# Patient Record
Sex: MALE | Race: WHITE | URBAN - METROPOLITAN AREA
[De-identification: names, ages, dates, MRNs, and addresses within clinical notes are randomized per-mention and may not be internally consistent; named-entity substitution may affect disease eponyms.]

---

## 2018-07-11 ENCOUNTER — THERAPY VISIT (OUTPATIENT)
Dept: PHYSICAL THERAPY | Facility: CLINIC | Age: 28
End: 2018-07-11
Payer: OTHER MISCELLANEOUS

## 2018-07-11 DIAGNOSIS — M54.41 BILATERAL LOW BACK PAIN WITH RIGHT-SIDED SCIATICA: Primary | ICD-10-CM

## 2018-07-11 PROCEDURE — 97161 PT EVAL LOW COMPLEX 20 MIN: CPT | Mod: GP | Performed by: PHYSICAL THERAPIST

## 2018-07-11 PROCEDURE — 97110 THERAPEUTIC EXERCISES: CPT | Mod: GP | Performed by: PHYSICAL THERAPIST

## 2018-07-11 NOTE — LETTER
[facility] PHYSICAL THERAP[facility]   [address] [address]  [phone]    2018    Re: Amrit Mackenzie   :   1990  MRN:  [id number]   REFERRING PHYSICIAN:   Valentino Villarreal    [facility] PHYSICAL [facility]ER[facility]  Date of Initial Evaluation:  18  Visits:  Rxs Used: 1  Reason for Referral:  Bilateral low back pain with right-sided sciatica    EVALUATION SUMMARY    [facility] Initial Evaluation  Subjective:  Patient is a 28 year old male presenting with rehab back hpi.   Amrit Mackenzie is a 28 year old male with a lumbar condition.  Condition occurred with:  Lifting.  Condition occurred: at work.  This is a new condition  Pt hurt low back lifting heavy object at work (70 lbs) on 18.   Patient reports pain:  Central lumbar spine.  Radiates to:  Gluteals right and thigh right.  Pain is described as burning and aching and is intermittent and reported as 1/10.  Associated symptoms:  Numbness and tingling. Pain is worse during the day.  Symptoms are exacerbated by lifting and relieved by activity/movement and rest.  Since onset symptoms are rapidly improving.  Special testing: none reported.  Previous treatment: n/a.  Improvement with previous treatment: n/a.  General health as reported by patient is good.  Past medical history: overweight, high BP.  Medical allergies: no.  Surgical history: eartubes.  Current medications:  Muscle relaxants and pain medication.  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  Primary job tasks include:  Driving, lifting, operating a machine, prolonged standing and repetitive tasks.  Barriers include:  None as reported by the patient.  Red flags:  None as reported by the patient.    Objective:  Standing Alignment:    Lumbar:  Anterior pelvic tilt     Lumbar/SI Evaluation  ROM:    AROM Lumbar:   Flexion:          80%   Ext:                     80%   Side Bend:        Left:  80%    Right:  80%  Rotation:           Left:     Right:   Side Glide:        Left:     Right:     Neural Tension/Mobility:  Lumbar:  Normal      Lumbar Palpation:  normal  Assessment/Plan:    Patient is a 28 year old male with lumbar complaints.    Patient has the following significant findings with corresponding treatment plan.                Diagnosis 1:  LBP  Pain -  mechanical traction, manual therapy, splint/taping/bracing/orthotics, self management, education, directional preference exercise and home program  Decreased ROM/flexibility - manual therapy and therapeutic exercise  Re: Amrit Mackenzie     Decreased joint mobility - manual therapy and therapeutic exercise  Decreased strength - therapeutic exercise and therapeutic activities  Impaired balance - neuro re-education and therapeutic activities    Therapy Evaluation Codes:   1) History comprised of:   Personal factors that impact the plan of care:      None.    Comorbidity factors that impact the plan of care are:      High blood pressure and Overweight.     Medications impacting care: Muscle relaxant and Pain.  2) Examination of Body Systems comprised of:   Body structures and functions that impact the plan of care:      Lumbar spine.   Activity limitations that impact the plan of care are:      Lifting, Working, Sleeping and Laying down.  3) Clinical presentation characteristics are:   Stable/Uncomplicated.  4) Decision-Making    Low complexity using standardized patient assessment instrument and/or measureable assessment of functional outcome.  Cumulative Therapy Evaluation is: Low complexity.    Previous and current functional limitations:  (See Goal Flow Sheet for this information)    Short term and Long term goals: (See Goal Flow Sheet for this information)     Communication ability:  Patient appears to be able to clearly communicate and understand verbal and written communication and follow directions  correctly.  Treatment Explanation - The following has been discussed with the patient:   RX ordered/plan of care  Anticipated outcomes  Possible risks and side effects  This patient would benefit from PT intervention to resume normal activities.   Rehab potential is good.    Frequency:  1 X week, once daily  Duration:  for 6 weeks  Discharge Plan:  Achieve all LTG.  Independent in home treatment program.  Reach maximal therapeutic benefit.    Please refer to the daily flowsheet for treatment today, total treatment time and time spent performing 1:1 timed codes.     Thank you for your referral.    INQUIRIES  Therapist: Omega Neri DPT  [facility]  [address] [place] [address]  Phone: [phone]  Fax: [phone]

## 2018-07-11 NOTE — PROGRESS NOTES
[place] [facility] Initial Evaluation  Subjective:  Patient is a 28 year old male presenting with rehab back hpi.   Amrit Mackenzie is a 28 year old male with a lumbar condition.  Condition occurred with:  Lifting.  Condition occurred: at work.  This is a new condition  Pt hurt low back lifting heavy object at work (70 lbs) on 6/27/18.   .    Patient reports pain:  Central lumbar spine.  Radiates to:  Gluteals right and thigh right.  Pain is described as burning and aching and is intermittent and reported as 1/10.  Associated symptoms:  Numbness and tingling. Pain is worse during the day.  Symptoms are exacerbated by lifting and relieved by activity/movement and rest.  Since onset symptoms are rapidly improving.  Special testing: none reported.  Previous treatment: n/a.  Improvement with previous treatment: n/a.  General health as reported by patient is good.  Past medical history: overweight, high BP.  Medical allergies: no.  Surgical history: eartubes.  Current medications:  Muscle relaxants and pain medication.  Current occupation is /Technician  .  Patient is working in normal job with restrictions.  Primary job tasks include:  Driving, lifting, operating a machine, prolonged standing and repetitive tasks.    Barriers include:  None as reported by the patient.    Red flags:  None as reported by the patient.                        Objective:  Standing Alignment:        Lumbar:  Anterior pelvic tilt                           Lumbar/SI Evaluation  ROM:    AROM Lumbar:   Flexion:          80%   Ext:                    80%   Side Bend:        Left:  80%    Right:  80%  Rotation:           Left:     Right:   Side Glide:        Left:     Right:                   Neural Tension/Mobility:  Lumbar:  Normal        Lumbar Palpation:  normal                                                         General     ROS    Assessment/Plan:    Patient is a 28 year old male with lumbar complaints.    Patient has  the following significant findings with corresponding treatment plan.                Diagnosis 1:  LBP  Pain -  mechanical traction, manual therapy, splint/taping/bracing/orthotics, self management, education, directional preference exercise and home program  Decreased ROM/flexibility - manual therapy and therapeutic exercise  Decreased joint mobility - manual therapy and therapeutic exercise  Decreased strength - therapeutic exercise and therapeutic activities  Impaired balance - neuro re-education and therapeutic activities    Therapy Evaluation Codes:   1) History comprised of:   Personal factors that impact the plan of care:      None.    Comorbidity factors that impact the plan of care are:      High blood pressure and Overweight.     Medications impacting care: Muscle relaxant and Pain.  2) Examination of Body Systems comprised of:   Body structures and functions that impact the plan of care:      Lumbar spine.   Activity limitations that impact the plan of care are:      Lifting, Working, Sleeping and Laying down.  3) Clinical presentation characteristics are:   Stable/Uncomplicated.  4) Decision-Making    Low complexity using standardized patient assessment instrument and/or measureable assessment of functional outcome.  Cumulative Therapy Evaluation is: Low complexity.    Previous and current functional limitations:  (See Goal Flow Sheet for this information)    Short term and Long term goals: (See Goal Flow Sheet for this information)     Communication ability:  Patient appears to be able to clearly communicate and understand verbal and written communication and follow directions correctly.  Treatment Explanation - The following has been discussed with the patient:   RX ordered/plan of care  Anticipated outcomes  Possible risks and side effects  This patient would benefit from PT intervention to resume normal activities.   Rehab potential is good.    Frequency:  1 X week, once daily  Duration:  for 6  weeks  Discharge Plan:  Achieve all LTG.  Independent in home treatment program.  Reach maximal therapeutic benefit.    Please refer to the daily flowsheet for treatment today, total treatment time and time spent performing 1:1 timed codes.

## 2018-07-11 NOTE — MR AVS SNAPSHOT
After Visit Summary   7/11/2018    Amrit Mackenzie    MRN: [id number]           Patient Information     Date Of Birth          1990        Visit Information        Provider Department      7/11/2018 5:20 PM Omega Neri, PT [facility] Physical Therapy        Today's Diagnoses     Bilateral low back pain with right-sided sciatica    -  1       Follow-ups after your visit        Your next 10 appointments already scheduled     Jul 30, 2018  5:20 PM CDT   MITCH Extremity with Thu Luo PT   [facility] Physical Therapy ([facility]  )    [address] [place] [address] [phone]              Who to contact     If you have questions or need follow up information about today's clinic visit or your schedule please contact [facility] ATHLETIC [facility] PHYSICAL THERAPY directly at [phone].  Normal or non-critical lab and imaging results will be communicated to you by MyChart, letter or phone within 4 business days after the clinic has received the results. If you do not hear from us within 7 days, please contact the clinic through MyChart or phone. If you have a critical or abnormal lab result, we will notify you by phone as soon as possible.  Submit refill requests through [employer] or call your pharmacy and they will forward the refill request to us. Please allow 3 business days for your refill to be completed.          Additional Information About Your Visit        Care EveryWhere ID     This is your Care EveryWhere ID. This could be used by other organizations to access your [place] medical records  [id number]         Blood Pressure from Last 3 Encounters:   No data found for BP    Weight from Last 3 Encounters:   No data found for Wt              We Performed the Following     HC PT EVAL, LOW COMPLEXITY     MITCH INITIAL EVAL REPORT     THERAPEUTIC EXERCISES        Primary Care Provider     None Specified       No address on file        Equal Access to Services     BEATRIZ ARMENDARIZSILVA : Hadii aad ku hadsymoneangel Karonalisha, wadeepcésar winchester, reinajuanjo rahmanmaeduardo lemus. So [facility] [phone].    ATENCIÓN: Si habla español, tiene a tolbert disposición servicios gratuitos de asistencia lingüística. Llame al [phone].    We comply with applicable federal civil rights laws and [place] laws. We do not discriminate on the basis of race, color, national origin, age, disability, sex, sexual orientation, or gender identity.            Thank you!     Thank you for choosing [facility] PHYSICAL THERAP[facility]  for your care. Our goal is always to provide you with excellent care. Hearing back from our patients is one way we can continue to improve our services. Please take a few minutes to complete the written survey that you may receive in the mail after your visit with us. Thank you!             Your Updated Medication List - Protect others around you: Learn how to safely use, store and throw away your medicines at www.disposemymeds.org.      Notice  As of 7/11/2018 11:59 PM    You have not been prescribed any medications.

## 2018-07-12 PROBLEM — M54.41 BILATERAL LOW BACK PAIN WITH RIGHT-SIDED SCIATICA: Status: ACTIVE | Noted: 2018-07-12

## 2018-07-16 NOTE — PROGRESS NOTES
[place] [facility] Initial Evaluation  Subjective:  Patient is a 28 year old male presenting with rehab back hpi.                                      Pertinent medical history includes:  High blood pressure and overweight.  Medical allergies: no.  Other surgeries include:  Other.  Current medications:  Pain medication.  Current occupation is Bus technician.    Primary job tasks include:  Driving, lifting, operating a machine, prolonged sitting and repetitive tasks.                                Objective:  System    Physical Exam    General     ROS    Assessment/Plan: